# Patient Record
Sex: FEMALE | URBAN - METROPOLITAN AREA
[De-identification: names, ages, dates, MRNs, and addresses within clinical notes are randomized per-mention and may not be internally consistent; named-entity substitution may affect disease eponyms.]

---

## 2021-08-20 ENCOUNTER — APPOINTMENT (OUTPATIENT)
Dept: URBAN - METROPOLITAN AREA CLINIC 220 | Age: 46
Setting detail: DERMATOLOGY
End: 2021-08-20

## 2021-08-20 VITALS — TEMPERATURE: 97.7 F

## 2021-08-20 DIAGNOSIS — L81.1 CHLOASMA: ICD-10-CM

## 2021-08-20 PROCEDURE — OTHER COUNSELING: OTHER

## 2021-08-20 PROCEDURE — OTHER MIPS QUALITY: OTHER

## 2021-08-20 PROCEDURE — OTHER PRESCRIPTION MEDICATION MANAGEMENT: OTHER

## 2021-08-20 ASSESSMENT — LOCATION DETAILED DESCRIPTION DERM
LOCATION DETAILED: LEFT INFERIOR FOREHEAD
LOCATION DETAILED: RIGHT FOREHEAD

## 2021-08-20 ASSESSMENT — LOCATION SIMPLE DESCRIPTION DERM
LOCATION SIMPLE: LEFT FOREHEAD
LOCATION SIMPLE: RIGHT FOREHEAD

## 2021-08-20 ASSESSMENT — LOCATION ZONE DERM: LOCATION ZONE: FACE

## 2021-08-20 NOTE — HPI: DISCOLORATION (MELASMA)
How Severe Are They?: mild
Is This A New Presentation, Or A Follow-Up?: Discoloration
Additional History: Patient has been using Triluma- intermittently for a year.\\nBirth Control x 23 years

## 2021-08-20 NOTE — PROCEDURE: PRESCRIPTION MEDICATION MANAGEMENT
Detail Level: Zone
Render In Strict Bullet Format?: No
Plan: 1. Discussed and highly recommended patient schedule CC with Latisha to discuss alternative treatment options- Chemical Peels/IPL/ additional products. (Patient has tried Tri-Lisandra with no improvement)\\n2. Continue Daily SS use\\n3. Discussed Melasma can be chronic/recurrent and very difficult to completely eradicate \\n4. Discussed Birth Control pills could be contributing to worsening and would consider alternative options if able.

## 2021-09-24 ENCOUNTER — APPOINTMENT (OUTPATIENT)
Dept: URBAN - METROPOLITAN AREA CLINIC 211 | Age: 46
Setting detail: DERMATOLOGY
End: 2021-09-24

## 2021-09-24 VITALS — TEMPERATURE: 97.1 F

## 2021-09-24 DIAGNOSIS — Z41.9 ENCOUNTER FOR PROCEDURE FOR PURPOSES OTHER THAN REMEDYING HEALTH STATE, UNSPECIFIED: ICD-10-CM

## 2021-09-24 PROCEDURE — OTHER OTHER (COSMETIC): OTHER

## 2021-09-24 PROCEDURE — OTHER MIPS QUALITY: OTHER

## 2021-09-24 NOTE — HPI: OTHER
Condition:: Cosmetic
Please Describe Your Condition:: is being seen for a chief complaint of Cosmetic. Patient presents in the office for a cosmetic consult. Patient complains of melasma. She states was given Tri-Lisandra by another dermatologist about 9-12 months ago, but she did not see any results.  And has been not using for several months now. She is currently using oil of Olay products. \\nMed and allergy hx are reviewed

## 2021-09-24 NOTE — PROCEDURE: OTHER (COSMETIC)
Other (Free Text): Cosmetic Consultation\\nPE: melasma, oily tzone \\n\\nPLAN:\\n\\n1.  Rec IPL with 1540 combo for partial face. Treatment reviewed, r&b and post treatment expectations. Informational handout given with CQ.   \\n\\n2. Rec microneedling for full face. Treatment reviewed, r&b and post treatment expectations. Informational handout given with CQ.   \\n\\n3. Rec chemical peels. Treatment reviewed, r&b and post treatment expectations. Informational handout given with CQ.   \\n\\n4. Rec beginning obagi clear- by applying to full face bid. Treatment reviewed, r&b and rationale provided. Informational handout given with CQ. \\n\\n5. Rec beginning to use moisturizer and SPF as well as reapplying daily. \\n\\nNOTE:\\n\\n1. Patient was given a travel size of Obagi foaming gel and toner complimentary at todays visit. \\n\\Choco indications, treatment expectations (including management of any possible irritations), protocols, risks and benefits, pre/post care are reviewed.  Patient understands that multiple treatments may be necessary for optimum results and that on going maintenance with at-home products and additional office visits or treatments may be needed to enhance and extend the desired results.\\n\\Choco questions were addressed.\\n\\nRTC-
Detail Level: Zone

## 2021-11-10 ENCOUNTER — APPOINTMENT (OUTPATIENT)
Dept: URBAN - METROPOLITAN AREA CLINIC 211 | Age: 46
Setting detail: DERMATOLOGY
End: 2021-11-11

## 2021-11-10 VITALS — TEMPERATURE: 97.9 F

## 2021-11-10 DIAGNOSIS — Z41.9 ENCOUNTER FOR PROCEDURE FOR PURPOSES OTHER THAN REMEDYING HEALTH STATE, UNSPECIFIED: ICD-10-CM

## 2021-11-10 PROCEDURE — OTHER OTHER (COSMETIC): OTHER

## 2021-11-10 PROCEDURE — OTHER MIPS QUALITY: OTHER

## 2021-11-10 NOTE — PROCEDURE: MIPS QUALITY
Quality 226: Preventive Care And Screening: Tobacco Use: Screening And Cessation Intervention: Patient screened for tobacco use and is an ex/non-smoker
Quality 431: Preventive Care And Screening: Unhealthy Alcohol Use - Screening: Patient not identified as an unhealthy alcohol user when screened for unhealthy alcohol use using a systematic screening method
Quality 110: Preventive Care And Screening: Influenza Immunization: Influenza Immunization previously received during influenza season
Quality 130: Documentation Of Current Medications In The Medical Record: Current Medications Documented
Detail Level: Detailed
Quality 431: Preventive Care And Screening: Unhealthy Alcohol Use - Screening: Patient screened for unhealthy alcohol use using a single question and scores less than 2 times per year

## 2021-11-10 NOTE — PROCEDURE: OTHER (COSMETIC)
Detail Level: Zone
Other (Free Text): Cosmetic Consultation\\nPE:melasma on the forehead and cheeks\\n\\nPLAN:\\n\\n1. Discussed that we could add the Obagi  with a tretinoin with conjunction with the Clear or we could consider adding a treatment.  If pt was not looking for any down time could consider introducing the Obagi  in combination with the tretinoin.  The products were reviewed in detail, r&b and rationale was provided.\\n\\n2. Rec microneedling layered with a chemical peel.  Rec pt try one treatment and see if there is any improvement and then consider moving forward with other treatments.  The procedure was reviewed in detail, r&b and post treatment expectations.  Informational handout was given with a cq.  \\n\\nNOTE:\\n\\n1.  Mentioned speaking to her OB about the traneximic acid as an oral approach.  Discussed that it was a new an off label approach.\\n\\Choco indications, treatment expectations (including management of any possible irritations), protocols, risks and benefits, pre/post care are reviewed.  Patient understands that multiple treatments may be necessary for optimum results and that on going maintenance with at-home products and additional office visits or treatments may be needed to enhance and extend the desired results.\\n\\Choco questions were addressed.\\n\\nRTC-tbd

## 2021-11-10 NOTE — HPI: OTHER
Condition:: Cosmetic
Please Describe Your Condition:: Pt presents for a follow up on her melasma.  Pt has been using the Obagi Clear ff BID and has noticed minimal improvement.  Medications, allergies and medical hx were reviewed.

## 2022-01-24 ENCOUNTER — APPOINTMENT (OUTPATIENT)
Dept: URBAN - METROPOLITAN AREA CLINIC 211 | Age: 47
Setting detail: DERMATOLOGY
End: 2022-01-24

## 2022-01-24 DIAGNOSIS — Z41.9 ENCOUNTER FOR PROCEDURE FOR PURPOSES OTHER THAN REMEDYING HEALTH STATE, UNSPECIFIED: ICD-10-CM

## 2022-01-24 PROCEDURE — OTHER OTHER (COSMETIC): OTHER

## 2022-01-24 PROCEDURE — OTHER MIPS QUALITY: OTHER

## 2022-01-24 NOTE — PROCEDURE: MIPS QUALITY
Quality 130: Documentation Of Current Medications In The Medical Record: Current Medications Documented
Quality 431: Preventive Care And Screening: Unhealthy Alcohol Use - Screening: Patient not identified as an unhealthy alcohol user when screened for unhealthy alcohol use using a systematic screening method
Detail Level: Detailed
Quality 431: Preventive Care And Screening: Unhealthy Alcohol Use - Screening: Patient screened for unhealthy alcohol use using a single question and scores less than 2 times per year
Quality 226: Preventive Care And Screening: Tobacco Use: Screening And Cessation Intervention: Patient screened for tobacco use and is an ex/non-smoker
Quality 110: Preventive Care And Screening: Influenza Immunization: Influenza Immunization previously received during influenza season

## 2022-01-24 NOTE — HPI: OTHER
Condition:: Cosmetic
Please Describe Your Condition:: Pt presents for a chemical peel to full face.  Pt is working on her melasma.  Pt has been using the Obagi Clear BID.  Medications, allergies and medical hx were reviewed.

## 2022-01-24 NOTE — PROCEDURE: OTHER (COSMETIC)
Other (Free Text): Plan: PCA Skin Chemical Peel\\nPE:\\n\\nPrior to the procedure, written consent was obtained. R&B were reviewed including but not limited to treatment expectations, redness, peeling, blistering, pigmentary change, scarring, infection, pain, social downtime and post care.\\n\\nTreatment #:1/1\\nPCA Name:\\nRiva’s Collection Name:\\nTreatment Site:Full Face\\n\\nPrep/Cleanse:n/a\\n\\nCleanse:PCA Facial Wash Oily/Problem\\n\\nPrep/Degrease:Acetone\\n\\nPre Treat:n/a\\n\\nTreat:PCA Ultra Peel x 3 layers sponged on with less moderate pressure\\nLot#:[id number]\\nExp:11/22\\n\\nCorrect:PCA Pigment Gel HQ Free\\nLot #:[id number]\\nExp:03/24\\n\\nCorrect #2:PCA Intensive Brightening Treatment\\nLot #:[id number]\\nExp:10/21\\n\\nBoost:Advanced Treatment Booster\\nLot #:[id number]\\nExp:\\n\\nCalming Balm:n/a\\n\\nHydrate & Protect:PCA Rebalance and PCA Weightless SPF 45\\n\\nPost Peel Frost was 0.\\nPost Peel Erythema was mild.\\n\\nNotes:\\n\\n1. UV protection and post care instructions are reviewed in detail along with written information. Patient is advised to use Vaseline to affected areas as needed.\\n\\n2. Patient is advised to defer washing face until the morning. Pt is advised to limit strenuous exercise/activity for a minimum of 48 hrs. Rationale provided including risk for blistering or other complication/s.\\n\\n\\n\\nPatient tolerated the treatment well with no immediate complication. Therapy was applied per protocol. All indications, treatment expectations, including management of irritation and tenderness, protocols, risks and benefits, pre/post care was reviewed. Details of these can be found on the informed consent documentation. Patient understands that multiple treatments may be necessary for optimum results, and that ongoing maintenance with skin care products and additional office visits or treatments may be needed to enhance and extend desired results.\\n\\nRTC:4-6 week Chemical Peel #2 Other (Free Text): Plan: PCA Skin Chemical Peel\\nPE:\\n\\nPrior to the procedure, written consent was obtained. R&B were reviewed including but not limited to treatment expectations, redness, peeling, blistering, pigmentary change, scarring, infection, pain, social downtime and post care.\\n\\nTreatment #:1/1\\nPCA Name:\\nRiva’s Collection Name:\\nTreatment Site:Full Face\\n\\nPrep/Cleanse:n/a\\n\\nCleanse:PCA Facial Wash Oily/Problem\\n\\nPrep/Degrease:Acetone\\n\\nPre Treat:n/a\\n\\nTreat:PCA Ultra Peel x 3 layers sponged on with less moderate pressure\\nLot#:[id number]\\nExp:11/22\\n\\nCorrect:PCA Pigment Gel HQ Free\\nLot #:[id number]\\nExp:03/24\\n\\nCorrect #2:PCA Intensive Brightening Treatment\\nLot #:[id number]\\nExp:10/21\\n\\nBoost:Advanced Treatment Booster\\nLot #:[id number]\\nExp:\\n\\nCalming Balm:n/a\\n\\nHydrate & Protect:PCA Rebalance and PCA Weightless SPF 45\\n\\nPost Peel Frost was 0.\\nPost Peel Erythema was mild.\\n\\nNotes:\\n\\n1. UV protection and post care instructions are reviewed in detail along with written information. Patient is advised to use Vaseline to affected areas as needed.\\n\\n2. Patient is advised to defer washing face until the morning. Pt is advised to limit strenuous exercise/activity for a minimum of 48 hrs. Rationale provided including risk for blistering or other complication/s.\\n\\n\\n\\nPatient tolerated the treatment well with no immediate complication. Therapy was applied per protocol. All indications, treatment expectations, including management of irritation and tenderness, protocols, risks and benefits, pre/post care was reviewed. Details of these can be found on the informed consent documentation. Patient understands that multiple treatments may be necessary for optimum results, and that ongoing maintenance with skin care products and additional office visits or treatments may be needed to enhance and extend desired results.\\n\\nRTC:4-6 week Chemical Peel #2

## 2022-03-18 ENCOUNTER — APPOINTMENT (OUTPATIENT)
Dept: URBAN - METROPOLITAN AREA CLINIC 211 | Age: 47
Setting detail: DERMATOLOGY
End: 2022-03-20

## 2022-03-18 DIAGNOSIS — Z41.9 ENCOUNTER FOR PROCEDURE FOR PURPOSES OTHER THAN REMEDYING HEALTH STATE, UNSPECIFIED: ICD-10-CM

## 2022-03-18 PROCEDURE — OTHER MIPS QUALITY: OTHER

## 2022-03-18 PROCEDURE — OTHER OTHER (COSMETIC): OTHER

## 2022-03-18 NOTE — PROCEDURE: OTHER (COSMETIC)
Other (Free Text): Plan: PCA Skin Chemical Peel\\nPE:\\n\\nPrior to the procedure, written consent was obtained. R&B were reviewed including but not limited to treatment expectations, redness, peeling, blistering, pigmentary change, scarring, infection, pain, social downtime and post care.\\n\\nTreatment #:1\\nPCA Name:\\nRiva’s Collection Name:\\nTreatment Site:full face\\n\\nPrep/Cleanse:\\n\\nCleanse:\\n\\nPrep/Degrease:\\n\\nPre Treat:\\n\\nTreat:\\nLot#:\\nExp:\\n\\nCorrect:\\nLot #:\\nExp:\\n\\nTreat #2:\\nLot #:\\nExp:\\n\\nBoost:\\nLot #:\\nExp:\\n\\nCalming Balm:\\n\\nHydrate & Protect:\\n\\nPost Peel Frost was 0.\\nPost Peel Erythema was mild.\\n\\nNotes:\\n\\n1. UV protection and post care instructions are reviewed in detail along with written information. Patient is advised to use Vaseline to affected areas as needed.\\n\\n2. Patient is advised to defer washing face until the morning. Pt is advised to limit strenuous exercise/activity for a minimum of 48 hrs. Rationale provided including risk for blistering or other complication/s.\\n\\n\\n\\nPatient tolerated the treatment well with no immediate complication. Therapy was applied per protocol. All indications, treatment expectations, including management of irritation and tenderness, protocols, risks and benefits, pre/post care was reviewed. Details of these can be found on the informed consent documentation. Patient understands that multiple treatments may be necessary for optimum results, and that ongoing maintenance with skin care products and additional office visits or treatments may be needed to enhance and extend desired results.\\n\\nRTC:
Detail Level: Zone

## 2022-03-18 NOTE — HPI: OTHER
Condition:: Cosmetic
Please Describe Your Condition:: is being seen for a chief complaint of Cosmetic. Pt presents for a chemical peel #2 to full face for managing melasma.  Pt states that she did well with her last peel . She found it easy to manage and pleased with improvement.   She peeling and flaked on her lower face for about a week and then flaked for a week on the forehead.  Pt states that she is starting to see some improvement.    Medications, allergies and medical hx were reviewed.

## 2022-03-18 NOTE — PROCEDURE: MIPS QUALITY
Detail Level: Detailed
Quality 431: Preventive Care And Screening: Unhealthy Alcohol Use - Screening: Patient screened for unhealthy alcohol use using a single question and scores less than 2 times per year
Quality 110: Preventive Care And Screening: Influenza Immunization: Influenza Immunization previously received during influenza season
Quality 431: Preventive Care And Screening: Unhealthy Alcohol Use - Screening: Patient not identified as an unhealthy alcohol user when screened for unhealthy alcohol use using a systematic screening method
Quality 226: Preventive Care And Screening: Tobacco Use: Screening And Cessation Intervention: Patient screened for tobacco use and is an ex/non-smoker
Quality 130: Documentation Of Current Medications In The Medical Record: Current Medications Documented

## 2022-05-11 ENCOUNTER — APPOINTMENT (OUTPATIENT)
Dept: URBAN - METROPOLITAN AREA CLINIC 211 | Age: 47
Setting detail: DERMATOLOGY
End: 2022-05-11

## 2022-05-11 DIAGNOSIS — Z41.9 ENCOUNTER FOR PROCEDURE FOR PURPOSES OTHER THAN REMEDYING HEALTH STATE, UNSPECIFIED: ICD-10-CM

## 2022-05-11 PROCEDURE — OTHER MIPS QUALITY: OTHER

## 2022-05-11 PROCEDURE — OTHER OTHER (COSMETIC): OTHER

## 2022-05-11 NOTE — HPI: OTHER
Condition:: Cosmetic
Please Describe Your Condition:: is an established patient who is being seen for a chief complaint of Cosmetic. Pt presents for a chemical peel to address melasma.  Pt sees improvement in her melasma.  She reports experiencing periorbital swelling while healing. All resolved w/o comp. She notes little peeling being seen but notes improvement. She is using Obagi cleanser and toner and would like an OTC option for financial reasons. Medications, allergies and medicla hx were reviewed.

## 2022-05-11 NOTE — PROCEDURE: OTHER (COSMETIC)
Other (Free Text): Plan: PCA Skin Chemical Peel\\nPE:\\n\\nPrior to the procedure, written consent was obtained. R&B were reviewed including but not limited to treatment expectations, redness, peeling, blistering, pigmentary change, scarring, infection, pain, social downtime and post care.\\n\\nTreatment #:\\nPCA Name:\\nRiva’s Collection Name:\\nTreatment Site:\\n\\nPrep/Cleanse:\\n\\nCleanse:\\n\\nPrep/Degrease:\\n\\nPre Treat:\\n\\nTreat:\\nLot#:\\nExp:\\n\\nCorrect:\\nLot #:\\nExp:\\n\\nTreat #2:\\nLot #:\\nExp:\\n\\nBoost:\\nLot #:\\nExp:\\n\\nCalming Balm:\\n\\nHydrate & Protect:\\n\\nPost Peel Frost was 0.\\nPost Peel Erythema was mild.\\n\\nNotes:\\n\\n1. UV protection and post care instructions are reviewed in detail along with written information. Patient is advised to use Vaseline to affected areas as needed.\\n\\n2. Patient is advised to defer washing face until the morning. Pt is advised to limit strenuous exercise/activity for a minimum of 48 hrs. Rationale provided including risk for blistering or other complication/s.\\n\\n\\n\\nPatient tolerated the treatment well with no immediate complication. Therapy was applied per protocol. All indications, treatment expectations, including management of irritation and tenderness, protocols, risks and benefits, pre/post care was reviewed. Details of these can be found on the informed consent documentation. Patient understands that multiple treatments may be necessary for optimum results, and that ongoing maintenance with skin care products and additional office visits or treatments may be needed to enhance and extend desired results.\\n\\nRTC:
Detail Level: Zone

## 2022-06-09 ENCOUNTER — APPOINTMENT (OUTPATIENT)
Dept: URBAN - METROPOLITAN AREA CLINIC 211 | Age: 47
Setting detail: DERMATOLOGY
End: 2022-06-09

## 2022-06-09 DIAGNOSIS — Z41.9 ENCOUNTER FOR PROCEDURE FOR PURPOSES OTHER THAN REMEDYING HEALTH STATE, UNSPECIFIED: ICD-10-CM

## 2022-06-09 PROCEDURE — OTHER MIPS QUALITY: OTHER

## 2022-06-09 PROCEDURE — OTHER OTHER (COSMETIC): OTHER

## 2022-06-09 NOTE — HPI: OTHER
Condition:: Cosmetic
Please Describe Your Condition:: is an established patient who is being seen for a chief complaint of Cosmetic . Patient presents today for follow up from chemical peel. Patient states last peel showed positive results and denies any concerns. She is using OTC neutragena healthy defense spf, [place]Roch Pose SPF .  Medications, allergies and med hx reviewed today.

## 2022-06-09 NOTE — PROCEDURE: MIPS QUALITY
Detail Level: Detailed
Quality 226: Preventive Care And Screening: Tobacco Use: Screening And Cessation Intervention: Patient screened for tobacco use and is an ex/non-smoker
Quality 130: Documentation Of Current Medications In The Medical Record: Current Medications Documented
Quality 110: Preventive Care And Screening: Influenza Immunization: Influenza Immunization previously received during influenza season
Quality 431: Preventive Care And Screening: Unhealthy Alcohol Use - Screening: Patient screened for unhealthy alcohol use using a single question and scores less than 2 times per year
Quality 431: Preventive Care And Screening: Unhealthy Alcohol Use - Screening: Patient not identified as an unhealthy alcohol user when screened for unhealthy alcohol use using a systematic screening method

## 2022-06-09 NOTE — PROCEDURE: OTHER (COSMETIC)
Detail Level: Zone
Other (Free Text): Cosmetic:\\nPlan: Follow Up \\n\\nPE: \\n\\nPlan: \\n\\n1. \\n\\n2. \\n\\n3.\\n\\nNotes:\\n\\n\\Choco indications, treatment expectations (including management of any possible irritations}, protocols, risks and benefits, pre/post care was reviewed. Details of these can be found on the informed consent documentation. Patient understands that multiple treatments may be necessary for optimum results, and that ongoing maintenance with skin care products and additional office visit or treatments may be needed to enhance and extend desired results. \\n\\n\\nAnswered al questions. \\n\\nRTC:\\n

## 2022-07-21 ENCOUNTER — APPOINTMENT (OUTPATIENT)
Dept: URBAN - METROPOLITAN AREA CLINIC 211 | Age: 47
Setting detail: DERMATOLOGY
End: 2022-07-23

## 2022-07-21 DIAGNOSIS — Z41.9 ENCOUNTER FOR PROCEDURE FOR PURPOSES OTHER THAN REMEDYING HEALTH STATE, UNSPECIFIED: ICD-10-CM

## 2022-07-21 PROCEDURE — OTHER OTHER (COSMETIC): OTHER

## 2022-07-21 PROCEDURE — OTHER MIPS QUALITY: OTHER

## 2022-07-21 NOTE — HPI: OTHER
Condition:: Cosmetic
Please Describe Your Condition:: is an established patient who is being seen for a chief complaint of Cosmetic . Melasma F/U. Pt is concern that some patchy brown hyperpigmentation has appeared bilat lateral zygomas. But is pleased that previous patches seen on her FH have not returned. She wonders if she proceed with chemical peels since they have been successful in managing the hyperpigmentation on her FH.